# Patient Record
(demographics unavailable — no encounter records)

---

## 2024-10-11 NOTE — HISTORY OF PRESENT ILLNESS
[No Pertinent Cardiac History] : no history of aortic stenosis, atrial fibrillation, coronary artery disease, recent myocardial infarction, or implantable device/pacemaker [Asthma] : asthma [No Adverse Anesthesia Reaction] : no adverse anesthesia reaction in self or family member [(Patient denies any chest pain, claudication, dyspnea on exertion, orthopnea, palpitations or syncope)] : Patient denies any chest pain, claudication, dyspnea on exertion, orthopnea, palpitations or syncope [COPD] : no COPD [Sleep Apnea] : no sleep apnea [Smoker] : not a smoker [Chronic Anticoagulation] : no chronic anticoagulation [Chronic Kidney Disease] : no chronic kidney disease [Diabetes] : no diabetes [FreeTextEntry1] : Lumbar Spine Injections [FreeTextEntry2] : 10/17/24 [FreeTextEntry3] : Dr. Salinas [FreeTextEntry4] : 63 year old male with PMHx of HTN, Asthma, HLD, Degenerative Disk Disease presenting to the office for medical clearance for Lumbar Spinal Injections with Dr. Salinas on 10/17/24

## 2024-10-11 NOTE — END OF VISIT
[] : Resident [FreeTextEntry3] : Pt in optimal medical condition for procedure, to hold Eliquis 3 days before procedure as per cardiology recommendations. Please call with any questions.

## 2024-10-11 NOTE — ASSESSMENT
[Patient Optimized for Surgery] : Patient optimized for surgery [No Further Testing Recommended] : no further testing recommended [FreeTextEntry4] :  Patient in optimal medical condition for stated procedure Advised to avoid Aspirin and NSAIDs 1 week before surgery Medications reviewed Early ambulation and adequate DVT prophylaxis perioperatively as per protocol Will benefit from perioperative nebulizers and incentive spirometry Please call with any questions or concerns

## 2024-10-17 NOTE — PROCEDURE
[de-identified] : Lumbar Medial Branch Block Under Fluoroscopic Guidance   Attending: Sean Salinas DO  PREOPERATIVE DIAGNOSIS: Lumbar Facet Joint Pain POSTOPERATIVE DIAGNOSIS: same  SEDATION: As per Anesthesia   PROCEDURE PERFORMED:  Lumbar Medial Branch Block at the bilateral L4-5 and L5-S1 levels   ESTIMATED BLOOD LOSS:  None FLUOROSCOPY WAS USED.    PROCEDURE AND FINDINGS:   Patient was greeted in the pre-procedure holding area. The risk, benefits and alternatives to the procedure were again reviewed with the patient and written informed consent was placed in the chart. They were taken to the procedure room and positioned prone on the fluoroscopy table.  Prior to the procedure a time out was completed, verifying correct patient, procedure, and site.     An AP fluoroscopic  film was taken to identify the vertebral bodies, pedicles, transverse processes and superior articulating processes of interest. The skin was prepped with chlorhexidine and draped in the usual sterile fashion. The skin and subcutaneous tissue overlying the aforementioned anatomic targets were anesthetized using a 27-gauge 1-1/2 inch needle with 1% preservative-free lidocaine for a total volume of 6 mls.     Then a 25-gauge 3.5 inch Quincke spinal needle was advanced under fluoroscopic guidance to the junction of the superior articular process and transverse process of the levels of interest (and at the junction of the sacral ala and superior articular process for the L5 dorsal ramus). After negative aspiration, 0.5 mls of contrast was injected under AP view at each level. There was no evidence of intravascular uptake or intrathecal spread on imaging.    At this point, after negative aspiration, a solution of 0.5ml of Lidocaine 2% was injected at each level without incident. The needles were flushed with a small amount of contrast and removed. The needle insertion site was dressed appropriately.   Patient was taken to the recovery room where they were monitored for a brief period of time. They tolerated the procedure well and were discharged home in stable condition with post procedural instructions.

## 2024-11-01 NOTE — PHYSICAL EXAM
[FreeTextEntry1] : PE: Constitutional: In NAD, calm and cooperative MSK (Back)  Inspection: no gross swelling identified  Palpation: Tenderness of the bilateral lower lumbar paraspinals and over bilateral SI joints  ROM: Pain at end lumbar extension>>flexion  Strength: 5/5 strength in bilateral lower extremities  Reflexes: 2+ Patella reflex bilaterally, 2+ Achilles reflex bilaterally, negative clonus bilaterally  Sensation: Intact to light touch in bilateral lower extremities Special tests: SLR: negative bilaterally Facet loading: positive bilaterally. DAYANARA: positive bilaterally Yoemans: positive bilaterally SI joint compression test: positive bilaterally

## 2024-11-01 NOTE — HISTORY OF PRESENT ILLNESS
[FreeTextEntry1] : Ms. PEDRO FISH is a 63 year old female who presents for follow up. At last visit, she was ordered a bilateral L4-5 and L5-S1 MBB for which she had done on 10/17/24. She did not get significant relief from the MBB except for some minor improvement in ROM.  Denies any side effects from injection.   Location: Bilateral low back, equal Onset: Chronic for years, no inciting events Provocation/Palliative: Worse with activity, better with rest Quality: Achy, Sharp Radiation: None Severity: Moderate Timing: Not improving with time  No bowel/bladder changes. No groin numbness.

## 2024-11-01 NOTE — ASSESSMENT
[FreeTextEntry1] : Ms. PEDRO FISH is a 63 year old female who presents with persistent low back pain, likely from her underlying spondylosis although no significant relief from MBB. Pain may be more caused by an underlying sacroiliac joint related pain. Denies any radicular pain at this time. Denies any red flag signs. Will recommend: - MRI L Spine reviewed - Discussed with patient the risks (including but not limited to bleeding, elevated blood sugar, allergic reaction, infection, nerve damage, etc), benefits and alternatives to a sacroiliac joint steroid injection for which patient understands and would like to proceed. R/B/A of stopping Eliquis reviewed with patient but she would like to continue it.   Return for procedure. Patient aware of red flag signs including any changes to their bowel/bladder control, groin numbness or new weakness. Patient knows to seek immediate attention by calling 911 or going to nearest ER if these symptoms appear.  This patient is being managed for a complex chronic pain that requires ongoing medical management. The nature of this condition requires a longitudinal relationship and monitoring over time for appropriate treatment.

## 2024-11-26 NOTE — PHYSICAL EXAM
[Normal] : no joint swelling and grossly normal strength and tone [de-identified] : right hand no deformity - good ROM

## 2024-11-26 NOTE — HISTORY OF PRESENT ILLNESS
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 Following with Dr. Montgomery for this L shoulder pain, treated with trigger point injections.  Pain now stable Allergy / Asthma - now stable  Dr. Miguel Lal - neurologist- ([place]) B12 deficiency: Taking sublingual replacement as advised.  c/o intermittent right hand / thumb locking and pain  uses paraffin wax bath, heat therapy with some help no falls or trauma   HCM Mammogram / breast US 06/2024 - dense breasts - was advised MRI - declined  Colonoscopy- 7/2018- diverticulosis and polyps, repeated 07/2024 - to repeat 3-5 yrs as per pt - Dr Tyler Pap smear- s/p hysterectomy DEXA- 11/2023- osteopenia, remains on calcium and vit D supplements daily Shingles vaccine- had one dose, declines 2 nd dose LDCT 06/2024 - no change in nodule - to repeat in 1 yr

## 2024-12-05 NOTE — PHYSICAL EXAM
[de-identified] : Examination of the [left] wrist reveals discomfort with flexion and compression at the level of the volar carpal tunnel eliciting numbness/tingling throughout the fingertips. There is a positive tinel. Patient is able to delineate numbness to median-sided digits volarly. [There is no obvious thenar atrophy] Examination of the [left] cubital tunnel reveals discomfort with compression with associated numbness and tingling at the fingertips. There is a positive tinel. Examination at the level of the [left] wrist reveals tenderness at the level of the first dorsal compartment with a positive finkelstein.  Examination of the [left] thumb basal joint reveals pain with compression of the CMC joint with a positive grind test for pain. Adjacent thumb MCP joint is stable without hyperextension [de-identified] : [4] views of [bilateral hands and wrists] were reviewed today in my office and were seen by me and discussed with the patient.  These [show findings consistent with bilateral basal joint OA and findings of IP joint OA]

## 2024-12-05 NOTE — HISTORY OF PRESENT ILLNESS
[FreeTextEntry1] : Marlys is a 63-year-old female who presents today for follow-up for chronic exacerbated left wrist and hand discomfort as well as numbness and tingling.  The symptoms are bothersome during both daytime and nighttime and are affecting her sleep.  Symptoms are bothersome and are affecting her activities involve pinch and  as well. EMG study from April 2024 reviewed today separately.

## 2024-12-06 NOTE — HISTORY OF PRESENT ILLNESS
[FreeTextEntry1] : Ms. PEDRO FISH is a 63 year old female who presents for follow up. At last visit, she was ordered a bilateral SI joint injection for which she had done on 11/21/24. She says she has had at least 50% relief with the injection. She is still taking Tylenol PRN. Denies any side effects or adverse effects from procedure.   Location: Bilateral low back, equal Onset: Chronic for years, no inciting events Provocation/Palliative: Worse with activity, better with rest Quality: Achy, Sharp Radiation: None Severity: Mild Timing: Improved with CSI.   No bowel/bladder changes. No groin numbness.

## 2024-12-06 NOTE — PHYSICAL EXAM
[FreeTextEntry1] : PE: Constitutional: In NAD, calm and cooperative MSK (Back)  Inspection: no gross swelling identified, no erythema or swelling around injection sites  Palpation: No TTP over lower back  ROM: Mild pain at end lumbar extension>>flexion  Strength: 5/5 strength in bilateral lower extremities  Reflexes: 2+ Patella reflex bilaterally, 2+ Achilles reflex bilaterally, negative clonus bilaterally  Sensation: Intact to light touch in bilateral lower extremities Special tests: SLR: negative bilaterally Facet loading: positive bilaterally.

## 2024-12-06 NOTE — ASSESSMENT
[FreeTextEntry1] : Ms. PEDRO FISH is a 63 year old female who presents with persistent low back pain, likely from SI joint related pain given significant relief from CSIs rather than MBBs. Denies any radicular pain at this time. Denies any red flag signs. Will recommend: - MRI L Spine reviewed - She will try to decrease Tizanidine down to BID (takes 1mg at a time), advised her of R/B/A of medication for which she understands.  - She will restart HEP  Return 6-8 weeks. Patient aware of red flag signs including any changes to their bowel/bladder control, groin numbness or new weakness. Patient knows to seek immediate attention by calling 911 or going to nearest ER if these symptoms appear.  This patient is being managed for a complex chronic pain that requires ongoing medical management. The nature of this condition requires a longitudinal relationship and monitoring over time for appropriate treatment.

## 2025-01-14 NOTE — HISTORY OF PRESENT ILLNESS
[FreeTextEntry1] : Marlys is a pleasant 63-year-old female who presents today with a longstanding history of left-sided wrist and hand discomfort.  She complains of numbness and tingling radiating down to all the fingers.  Describes significant improvement with prior injections and bracing however with recurrence

## 2025-01-14 NOTE — ASSESSMENT
[FreeTextEntry1] : ASSESSMENT: The patient comes in today with chronic exacerbated symptoms of peripheral nerve impingement including carpal and cubital.  We have also discussed basal joint arthropathy and tendinosis.  For these conditions the injections have been very helpful.  Describes improvement with nerve injections as well.  Today she does elect to repeat them.  We have discussed continued bracing and activity modification. We have also discussed separately right wrist ulnar-sided wrist discomfort.  She does have a brace.  We have discussed treatment options including oral medication as well   The patient was adequately and thoroughly informed of my assessment of their current condition(s).  - This may diminish bodily function for the extremity. We discussed prognosis, treatment modalities including operative and nonoperative options for the above diagnostic assessment. For this, when accessible, I was able to review other physicians note(s) including reviewing other tests, imaging results as well as personally view these results for my own interpretation.   Injection:  The risks and benefits of a steroid injection were discussed in detail. The risks include but are not limited to: pain, infection, swelling, flare response, bleeding, subcutaneous fat atrophy, skin depigmentation and/or elevation of blood sugar. The risk of incomplete resolution of symptoms, recurrence and additional intervention was reviewed and considered by the patient.  The patient agreed to proceed and under a sterile prep, I injected 1 unit into 1 cc of a combination of Celestone and Lidocaine into the left carpal tunnel, left cubital tunnel. The patient tolerated the injection well. The patient was adequately and thoroughly informed of my assessment of their current condition(s).  DISCUSSION: 1.  Injections as above.  Activity modification.  Follow-up in 2months.   2.  Left basal joint bracing, right ulnar-sided wrist pain bracing 3. [x]

## 2025-01-14 NOTE — PHYSICAL EXAM
[de-identified] : Examination of the [left] cubital tunnel reveals discomfort with compression with associated numbness and tingling at the fingertips. There is a positive tinel. Examination of the [left] wrist reveals discomfort with compression at the level of the volar carpal tunnel eliciting numbness/tingling throughout the fingertips Examination at the level of the [left] wrist reveals tenderness at the level of the first dorsal compartment with a positive finkelstein. Examination of the left thumb basal joint reveals pain with compression of the CMC joint with a positive grind test for pain.  [Adjacent thumb MCP joint is stable without hyperextension]  Examination of the [right] wrist reveals tenderness at the distal ulna dorsal with pain upon compression of the fovea. There is discomfort with ulnar grinding as well as with ends of pronation and supination at the ulnar wrist. There is discomfort with compression of the dorsal triquetrum.    [de-identified] : [4] views of [bilateral hands and wrists] were reviewed today in my office and were seen by me and discussed with the patient.  These [show findings consistent with bilateral basal joint OA and findings of IP joint OA]

## 2025-03-04 NOTE — ASSESSMENT
[FreeTextEntry1] :  EKG: Normal sinus rhythm at 68 right ventricular conduction delay leftward axis deviation nonspecific T wave abnormalities.  ECG obtained to assist in diagnosis and management of assessed problem(s).  Laboratory data----------6/2/20------9/12/20 ---6/30/21----2/25/22--5/31/23--6/5/24 Cholesterol-----------------232------------177----------------------163------160------154 HDL---------------------------46-------------69------------------------68---------73-------64 LDL -----------not measurable -----------88------------------------81---------67-------74 Triglycerides-- ------------670-------------98-------------------------71-------100-------85 Hemoglobin ----------------12 -----------------------11.8------------5.3------------------5.3 A1c  5.3 Creat---------------------------------------------------0.81------------0.70  Echocardiogram 3/1/2024 Normal LV size and function with small area of inferior basilar hypokinesis ejection fraction of 55 to 60% Mitral valve thickening Aortic valve sclerosis Mild dilatation of the ascending aorta. Compared to prior study LVH has resolved Ascending aorta appears slightly smaller in diameter.  Echocardiogram 5/8/2023: Basal inferior wall hypokinesis but ejection fraction preserved. LVH Biatrial enlargement Moderate dilatation of the ascending aorta (4.6/4.4) slightly increased comparison to study 2021  Echocardiogram 8/10/2021: Basal mid inferolateral segments hypokinetic LVEF 55% Biatrial enlargement Dilated aorta 4.1 cm  Carotid duplex 8/10/2021: Left: Minimal plaque: Peak 118 cm/s Right carotid: No significant disease  Pharmacologic nuclear stress test 7/23/20: Defect of the mid apical and inferolateral consistent with scarring and infarction. At no ischemia is seen. Unchanged in comparison to prior study 2018  Lexiscan sestamibi stress test March 6, 2018 Moderate size fixed defect involving the basal and mid inferolateral wall consistent with infarct. No significant ischemia. Gated ejection fraction mildly diminished  A cardiac CT for calcium scoring performed at an outside facility her calcium score is virtually zero and no plaque is identified.  Impression: 1. PAF: No clinical recurrence of atrial fibrillation since her last office visit. Review of her [facility] recordings show sinus rhythm. Denies any recent palpitations.   2. By ECG criteria, echocardiography, and nuclear stress testing criteria, the patient had a moderate size inferolateral wall mi. The wall motion abnormality previously seen seems to be somewhat improved. The absence of atherosclerotic disease on coronary angiography suggests that this was maybe a thrombotic/embolic phenomena.  3. Status post SVT ablation; off amiodarone therapy. Rhythm today SR. No clinical recurrence of SVT. No palpitations.  4. Dizzy spells and lack of balance resolved since staggering her medications. Headache has resolved since increase in Irbesartan. Home blood pressures well controlled.   5. Mildly dilated ascending aorta appears to be stable.  6. Inferolateral wall MI as has been previously documented with preserved LV function unchanged  7.  There is been resolution of LVH by echo criteria consistent with very close blood pressure control.  Plan: 1. Continue current antihypertensives. Continue periodic home blood pressure monitoring. Notify us if she has persistent elevations. Avoid salt. Patient advised that chronic pain may be a factor in blood pressure elevations.   2. Continue to follow a heart healthy diet consisting of more vegetables, leans meats, whole grains, nuts and fruits. Avoid trans fats, saturated fats and processed meats.  3. Continue Eliquis for thromboembolic ppx.   4. Repeat echocardiogram in 1 to 2 years looking at wall motion abnormalities dilated aorta and the interval findings of LVH with resolution.  5. Pt knows to call if any new or worsening symptoms. In the absence of any cardiac associated symptoms clinical follow up can be made 6 months.   EKG obtained to assist in diagnosis and management of assessed problem(s).

## 2025-03-04 NOTE — HISTORY OF PRESENT ILLNESS
[FreeTextEntry1] : Under a lot of stress because her  has prostate cancer and will require full prostatectomy soon.  Continues to take Irbesartan 150 mg BID and reports that home blood pressures are well controlled.  Tolerating medications well and without side effects.   Pt denies any chest pain, dizziness, syncope, near-syncope, SOB, edema, orthopnea or PND.

## 2025-03-04 NOTE — REVIEW OF SYSTEMS
[Weight Gain (___ Lbs)] : [unfilled] ~Ulb weight gain [Joint Pain] : joint pain [Negative] : Neurological [Weight Loss (___ Lbs)] : no recent weight loss [Palpitations] : no palpitations [FreeTextEntry2] : Other than as documented here and in the HPI, the thirteen point ROS is negative

## 2025-03-04 NOTE — REASON FOR VISIT
[FreeTextEntry1] : PEDRO FISH is a 63-year-old F presents here for cardiology follow-up  Her hx includes: 1. inferolateral wall myocardial infarction  2. Ablation for supraventricular tachycardia Fall 2017 3. Postprocedural pericarditis, pericardial effusion, pericardial window 4. History of vasodepressor physiology 5. History of chronic pancreatic insufficiency 6. HTN 7. Paroxysmal afib 8. Palpitations

## 2025-03-04 NOTE — PHYSICAL EXAM
[FreeTextEntry1] :                    Well appearing and nourished and in no distress.  Eyes:  No conjunctival injection and no xanthelasmas. HEENT:  Normocephalic.Normal oral mucosa. No pallor or cyanosis Neck:  No jugular venous distension. with normal A and V wave forms. No palpable adenopathy. Cardiovascular:  Normal rate and rhythm with normal S1, S2 and a grade 1/6 systolic murmur. Distal arterial pulses are normal. No significant peripheral edema. Pulmonary:  Lungs are clear to auscultation and percussion. Normal respiratory pattern without any accessory muscle use Abdomen:  Soft, non-tender ; no palpable organomegaly or masses. Extremities:  No digital clubbing, cyanosis or ischemic changes. Skin:  No skin lesions, rashes, ulcers or xanthomas. Psychiatric:  Alert and oriented to person, place and time. Appropriate mood and affect.

## 2025-03-12 NOTE — PHYSICAL EXAM
[Normal] : no rash [Soft] : abdomen soft [Non-distended] : non-distended [de-identified] : Epigastric tenderness on palpation noted, no rigidity or guarding.

## 2025-03-12 NOTE — PLAN
[FreeTextEntry1] : A/P: New onset abdominal pain: Needs further workup, to check labs, CT abdomen stat To ER if has any worsening pain or any other new symptoms.  Patient in agreement.  Asthma: Stable on inhaler and montelukast, to continue with same. No recent exacerbation, doing well  Hypertension: Stable on medications, to continue with same, strict low-salt diet, to check blood pressure at home and keep a log.  Lung nodules: Last CT done June 2024 reviewed, to be repeated in 1 year will be monitored.  Cardiomyopathy: Asymptomatic, to continue with medications, and follow-up with cardiology as advised Afib: rate controlled, to c/w metoprolol and eliquis, f/u with cardiology as advised.   Hyperlipidemia: Advised on strict dietary modification, daily regular exercise. Follow-up after CT

## 2025-03-12 NOTE — HISTORY OF PRESENT ILLNESS
[FreeTextEntry1] : f/u HTN/ chronic pancreatic insufficiency/abdominal pain [de-identified] : Marlys is a 64 yo F w PMHx pAF on eliquis, hx inferolateral infarction likely 2/2 thromboembolic event, ST s/p ablation (2017), HTN, hx chronic pancreatic insufficiency, endometriosis s/o complete hysterectomy, osteopenia, enlarged ascending aorta (4.1->4.6) - following with cardiology. Following with Dr. Montgomery for this L shoulder pain, treated with trigger point injections.  Pain now stable Allergy / Asthma - now stable  Dr. Miguel Lal - neurologist- ([place]) B12 deficiency: Taking sublingual replacement as advised.  Endorses approximately 10-day onset of epigastric dull pain radiates to the back, has associated nausea, no vomiting, no diarrhea, melena, blood in stools.  Symptoms feel similar to when she had pancreatitis in the past.  Denies any fever or chills.   HCM Mammogram / breast US 06/2024 - dense breasts - was advised MRI - declined  Colonoscopy- 7/2018- diverticulosis and polyps, repeated 07/2024 - to repeat 3-5 yrs as per pt - Dr Tyler Pap smear- s/p hysterectomy DEXA- 11/2023- osteopenia, remains on calcium and vit D supplements daily Shingles vaccine- had one dose, declines 2 nd dose LDCT 06/2024 - no change in nodule - to repeat in 1 yr

## 2025-04-22 NOTE — HISTORY OF PRESENT ILLNESS
[FreeTextEntry1] : Marlys is a pleasant 63-year-old female who presents today with a longstanding history of left-sided wrist and hand discomfort.  She complains of numbness and tingling radiating down to all the fingers.  Patient describes difficulty with  and pinch.  Wakes her up at night normal...

## 2025-04-22 NOTE — PHYSICAL EXAM
[de-identified] : Examination of the [left] cubital tunnel reveals discomfort with compression with associated numbness and tingling at the fingertips. There is a positive tinel. Exam [left] wrist + Durkan's with + N/T. There is + tinel. Patient is able to delineate numbness to median-sided digits volarly. [No obvious thenar atrophy]  Examination at the level of the [left] wrist reveals tenderness at the level of the first dorsal compartment with a positive finkelstein. Examination of the left thumb basal joint reveals pain with compression of the CMC joint with a positive grind test for pain.  [Adjacent thumb MCP joint is stable without hyperextension] [de-identified] : [4] views of [bilateral hands and wrists] were reviewed today in my office and were seen by me and discussed with the patient.  These [show findings consistent with bilateral basal joint OA and findings of IP joint OA]

## 2025-04-22 NOTE — ASSESSMENT
[FreeTextEntry1] : ASSESSMENT: The patient comes in today with chronic exacerbated complaints of elbow wrist and thumb discomfort.  At this stage we have discussed carpal tunnel and cubital tunnel.  We have discussed bracing activity modification elbow extension modalities.  We have discussed basal joint arthropathy and tendinosis.  At this stage we have discussed all of the above very thoroughly and patient elects to proceed with injections.  We have discussed risk and benefits   The patient was adequately and thoroughly informed of my assessment of their current condition(s).  - This may diminish bodily function for the extremity. We discussed prognosis, tx modalities including operative and nonoperative options for the above diagnostic assessment. As always, 2nd opinion is always provided as an option.  When accessible, I was able to review other physicians note(s) including reviewing other tests, imaging results as well as personally view these results for my own interpretation.   Injection procedure for left carpal tunnel:   The risks and benefits of a steroid injection were discussed in detail. The risks include but are not limited to: pain, infection, swelling, flare response, bleeding, subcutaneous fat atrophy, skin depigmentation and/or elevation of blood sugar. The risk of incomplete resolution of symptoms, recurrence and additional intervention was reviewed and considered by the patient. The patient agreed to proceed and under a sterile prep, I injected 1 unit 6mg into 1 cc of a combination of Celestone and Lidocaine into the above stated location for the procedure. The patient tolerated the injection well.  Injection procedure for left cubital tunnel:   The risks and benefits of a steroid injection were discussed in detail. The risks include but are not limited to: pain, infection, swelling, flare response, bleeding, subcutaneous fat atrophy, skin depigmentation and/or elevation of blood sugar. The risk of incomplete resolution of symptoms, recurrence and additional intervention was reviewed and considered by the patient. The patient agreed to proceed and under a sterile prep, I injected 1 unit 6mg into 1 cc of a combination of Celestone and Lidocaine into the above stated location for the procedure. The patient tolerated the injection well.  Injection procedure for left first dorsal compartment tendon subsheath:   The risks and benefits of a steroid injection were discussed in detail. The risks include but are not limited to: pain, infection, swelling, flare response, bleeding, subcutaneous fat atrophy, skin depigmentation and/or elevation of blood sugar. The risk of incomplete resolution of symptoms, recurrence and additional intervention was reviewed and considered by the patient. The patient agreed to proceed and under a sterile prep, I injected 1 unit 6mg into 1 cc of a combination of Celestone and Lidocaine into the above stated location for the procedure. The patient tolerated the injection well.  Injection procedure for left first CMC joint-medium joint:   The risks and benefits of a steroid injection were discussed in detail. The risks include but are not limited to: pain, infection, swelling, flare response, bleeding, subcutaneous fat atrophy, skin depigmentation and/or elevation of blood sugar. The risk of incomplete resolution of symptoms, recurrence and additional intervention was reviewed and considered by the patient. The patient agreed to proceed and under a sterile prep, I injected 1 unit 6mg into 1 cc of a combination of Celestone and Lidocaine into the above stated location for the procedure. The patient tolerated the injection well.  The patient was adequately and thoroughly informed of my assessment of their current condition(s).  DISCUSSION: 1.  Injections as above.  Bracing activity modification.  Follow-up 3 months as requested 2.  Appreciates nonoperative care 3. [x]

## 2025-05-13 NOTE — ASSESSMENT
[FreeTextEntry1] : Ms. PEDRO FISH is a 63 year old female who presents with persistent low back pain, likely from SI joint related pain given significant relief from CSIs rather than MBBs. She now presents with return of pain. Denies any radicular pain at this time. She does however now complain of severe L hip pain, possibly due to OA or other pathology. Denies any red flag signs. Will recommend: - MRI L Spine reviewed - Will order MRI L hip given severity of L hip pain and degenerative changes seen on CT Abd/Pelvis done in 3/2025. Will call patient with results.  - Discussed with patient the risks (including but not limited to bleeding, elevated blood sugar, allergic reaction, infection, nerve damage, etc), benefits and alternatives to a bilateral sacroiliac joint steroid injection for which patient understands and would like to proceed.   Return for procedure. Patient aware of red flag signs including any changes to their bowel/bladder control, groin numbness or new weakness. Patient knows to seek immediate attention by calling 911 or going to nearest ER if these symptoms appear.  This patient is being managed for a complex chronic pain that requires ongoing medical management. The nature of this condition requires a longitudinal relationship and monitoring over time for appropriate treatment.

## 2025-05-13 NOTE — PHYSICAL EXAM
[FreeTextEntry1] : PE: Constitutional: In NAD, calm and cooperative MSK (Back/L hip)  Inspection: no gross swelling identified  Palpation: Mild TTP over SI joints  ROM: Mild pain at end lumbar extension>>flexion, significant pain with IR of L hip  Strength: 5/5 strength in bilateral lower extremities  Reflexes: 2+ Patella reflex bilaterally, 2+ Achilles reflex bilaterally, negative clonus bilaterally  Sensation: Intact to light touch in bilateral lower extremities Special tests: SLR: negative bilaterally DAYANARA: positive bilaterally FADIR: positive on L, negative on R SI joint compression test positive bilaterally Yoemans positive bilaterally

## 2025-05-13 NOTE — DATA REVIEWED
[FreeTextEntry1] : CMP 6/2024 reviewed CBC 6/2024 reviewed MR L Spine 2/2024 reviewed CT Abd/Pelvis 3/2025 reviewed and interpreted by me: degenerative changes of hips seen  EXAM: [id number] - CT ABDOMEN AND PELVIS WAW IC  - ORDERED BY: TIMMY AMOS   PROCEDURE DATE:  03/13/2025    INTERPRETATION:  CLINICAL INFORMATION: Abdominal pain; pancreatic insufficiency.  ADDITIONAL CLINICAL INFORMATION: Other, Non-specified  COMPARISON: None.  CONTRAST/COMPLICATIONS: IV Contrast: Omnipaque 350  90 cc administered   10 cc discarded Oral Contrast: NONE   PROCEDURE: CT of the Abdomen and Pelvis was performed. Arterial and Portal Venous phases were acquired. Sagittal and coronal reformats were performed.  FINDINGS: LOWER CHEST: Calcified left lower lobe nodules consistent with prior granulomatous disease. Partially visualized breast implants.  LIVER: 1.1 cm indeterminate segment 8 lesion (9-48). BILE DUCTS: Normal caliber. GALLBLADDER: Within normal limits. SPLEEN: Splenic calcifications. PANCREAS: Within normal limits. ADRENALS: Within normal limits. KIDNEYS/URETERS: Symmetric renal enhancement. Left parapelvic cysts. Small left cortical renal cyst.  BLADDER: Within normal limits. REPRODUCTIVE ORGANS: Hysterectomy.  BOWEL: No bowel obstruction. Colonic diverticulosis. Normal appendix. PERITONEUM/RETROPERITONEUM: Within normal limits. VESSELS: Atherosclerotic changes. LYMPH NODES: No lymphadenopathy. ABDOMINAL WALL: Within normal limits. BONES: Scoliosis and degenerative changes.  IMPRESSION:  Normal CT appearance of the pancreas.  Indeterminate 1.1 cm hepatic lesion. Recommend MRI abdomen with and without contrast for further evaluation.    --- End of Report ---       WENDY MCGUIRE MD; Attending Radiologist This document has been electronically signed. Mar 13 2025  3:12PM

## 2025-05-13 NOTE — HISTORY OF PRESENT ILLNESS
[FreeTextEntry1] : Ms. PEDRO FISH is a 63 year old female who presents for follow up. At last visit in 12/2024, she was restarted on HEP and advised to decrease Tizanidine usage. She now returns with similar pain but also complaining of L lateral hip pain for last few months. No new trauma. No radicular pain.    Location: Bilateral low back, equal, L hip Onset: Chronic for years, no inciting events in terms of back pain. Now also complaining of L lateral hip pain since early 2025.  Provocation/Palliative: Worse with activity, better with rest Quality: Achy, Sharp Radiation: None Severity: 9/10 in L hip, 7/10 in low back Timing: Improved with CSI in past, not worsening.   No bowel/bladder changes. No groin numbness.

## 2025-06-25 NOTE — DATA REVIEWED
[FreeTextEntry1] : CMP 6/2024 reviewed CBC 6/2024 reviewed MR L Spine 2/2024 reviewed CT Abd/Pelvis 3/2025 reviewed and interpreted by me: degenerative changes of hips seen  EXAM: [id number] - CT ABDOMEN AND PELVIS WAW IC - ORDERED BY: TIMMY AMOS   PROCEDURE DATE: 03/13/2025    INTERPRETATION: CLINICAL INFORMATION: Abdominal pain; pancreatic insufficiency.  ADDITIONAL CLINICAL INFORMATION: Other, Non-specified  COMPARISON: None.  CONTRAST/COMPLICATIONS: IV Contrast: Omnipaque 350 90 cc administered 10 cc discarded Oral Contrast: NONE   PROCEDURE: CT of the Abdomen and Pelvis was performed. Arterial and Portal Venous phases were acquired. Sagittal and coronal reformats were performed.  FINDINGS: LOWER CHEST: Calcified left lower lobe nodules consistent with prior granulomatous disease. Partially visualized breast implants.  LIVER: 1.1 cm indeterminate segment 8 lesion (9-48). BILE DUCTS: Normal caliber. GALLBLADDER: Within normal limits. SPLEEN: Splenic calcifications. PANCREAS: Within normal limits. ADRENALS: Within normal limits. KIDNEYS/URETERS: Symmetric renal enhancement. Left parapelvic cysts. Small left cortical renal cyst.  BLADDER: Within normal limits. REPRODUCTIVE ORGANS: Hysterectomy.  BOWEL: No bowel obstruction. Colonic diverticulosis. Normal appendix. PERITONEUM/RETROPERITONEUM: Within normal limits. VESSELS: Atherosclerotic changes. LYMPH NODES: No lymphadenopathy. ABDOMINAL WALL: Within normal limits. BONES: Scoliosis and degenerative changes.  IMPRESSION:  Normal CT appearance of the pancreas.  Indeterminate 1.1 cm hepatic lesion. Recommend MRI abdomen with and without contrast for further evaluation.    --- End of Report ---       WENDY MCGUIRE MD; Attending Radiologist This document has been electronically signed. Mar 13 2025 3:12PM

## 2025-06-25 NOTE — ASSESSMENT
[FreeTextEntry1] : Ms. PEDRO FISH is a 63 year old female who presents with persistent low back pain, likely from SI joint related pain given significant relief from CSIs rather than MBBs. She now presents with return of pain. Denies any radicular pain at this time. Denies any red flag signs. Will recommend: - MRI L Spine reviewed - Discussed with patient the risks (including but not limited to bleeding, elevated blood sugar, allergic reaction, infection, nerve damage, etc), benefits and alternatives to a repeat bilateral sacroiliac joint steroid injection for which patient understands and would like to proceed.  Return for procedure. Patient aware of red flag signs including any changes to their bowel/bladder control, groin numbness or new weakness. Patient knows to seek immediate attention by calling 911 or going to nearest ER if these symptoms appear.  This patient is being managed for a complex chronic pain that requires ongoing medical management. The nature of this condition requires a longitudinal relationship and monitoring over time for appropriate treatment.

## 2025-06-25 NOTE — PHYSICAL EXAM
[FreeTextEntry1] : PE: Constitutional: In NAD, calm and cooperative MSK (Back/L hip)  Inspection: no gross swelling identified  Palpation: Mild TTP over SI joints  ROM: Mild pain at end lumbar extension>>flexion, significant pain with IR of L hip  Strength: 5/5 strength in bilateral lower extremities  Reflexes: 2+ Patella reflex bilaterally, 2+ Achilles reflex bilaterally, negative clonus bilaterally  Sensation: Intact to light touch in bilateral lower extremities Special tests: SLR: negative bilaterally DAYANARA: positive bilaterally FADIR: positive on L, negative on R SI joint compression test positive bilaterally Yoemans positive bilaterally.

## 2025-06-25 NOTE — DATA REVIEWED
[FreeTextEntry1] : CMP 6/2024 reviewed CBC 6/2024 reviewed MR L Spine 2/2024 reviewed CT Abd/Pelvis 3/2025 reviewed and interpreted by me: degenerative changes of hips seen  EXAM: [id number] - CT ABDOMEN AND PELVIS WAW IC - ORDERED BY: TIMMY AMOS   PROCEDURE DATE: 03/13/2025    INTERPRETATION: CLINICAL INFORMATION: Abdominal pain; pancreatic insufficiency.  ADDITIONAL CLINICAL INFORMATION: Other, Non-specified  COMPARISON: None.  CONTRAST/COMPLICATIONS: IV Contrast: Omnipaque 350 90 cc administered 10 cc discarded Oral Contrast: NONE   PROCEDURE: CT of the Abdomen and Pelvis was performed. Arterial and Portal Venous phases were acquired. Sagittal and coronal reformats were performed.  FINDINGS: LOWER CHEST: Calcified left lower lobe nodules consistent with prior granulomatous disease. Partially visualized breast implants.  LIVER: 1.1 cm indeterminate segment 8 lesion (9-48). BILE DUCTS: Normal caliber. GALLBLADDER: Within normal limits. SPLEEN: Splenic calcifications. PANCREAS: Within normal limits. ADRENALS: Within normal limits. KIDNEYS/URETERS: Symmetric renal enhancement. Left parapelvic cysts. Small left cortical renal cyst.  BLADDER: Within normal limits. REPRODUCTIVE ORGANS: Hysterectomy.  BOWEL: No bowel obstruction. Colonic diverticulosis. Normal appendix. PERITONEUM/RETROPERITONEUM: Within normal limits. VESSELS: Atherosclerotic changes. LYMPH NODES: No lymphadenopathy. ABDOMINAL WALL: Within normal limits. BONES: Scoliosis and degenerative changes.  IMPRESSION:  Normal CT appearance of the pancreas.  Indeterminate 1.1 cm hepatic lesion. Recommend MRI abdomen with and without contrast for further evaluation.    --- End of Report ---       WENDY MCGUIRE MD; Attending Radiologist This document has been electronically signed. Mar 13 2025 3:12PM

## 2025-06-25 NOTE — HISTORY OF PRESENT ILLNESS
[FreeTextEntry1] : Ms. PEDRO FISH is a 63 year old female who presents for follow up. At last visit, she was ordered an MRI L Hip and ordered a bilateral SI joint CSI for which she underwent it on 6/5/25. She got significant relief from the CSIs (over 75%) but then did some gardening that has recently provoked the pain. Denies any new symptoms. She said the injections also helped with her hip pain.    Location: Bilateral low back, equal, L hip Onset: Chronic for years, no inciting events in terms of back pain. Now also complaining of L lateral hip pain since early 2025. Provocation/Palliative: Worse with activity, better with rest Quality: Achy, Sharp Radiation: None Severity: 2/10 at rest, can be moderate Timing: Improved with CSI in past  No bowel/bladder changes. No groin numbness.

## 2025-07-07 NOTE — HISTORY OF PRESENT ILLNESS
[FreeTextEntry1] : Patient woke up 3:30 am with heart palpitations and dizziness  /78 (usual BP 130s/80-90s) and noted she was in Afib with HR of 120s on Kartia device. She went to the emergency department at [facility] but felt that it was taking an inordinate of time to be seen so she eloped without treatment. Hospital records reveal EKG afib rvr hr 107, CXR clear lungs, BNP 1223, CBC/CMP/trop unremarkable  She reports strict compliance with Eliquis  she feels "exhausted" with intermittent palpitations. Dizziness has resolved.  Pt denies any chest pain, syncope, near-syncope, SOB, edema, orthopnea or PND.

## 2025-07-07 NOTE — ASSESSMENT
[FreeTextEntry1] :  EKG: a fib ventricular rate 85 irbbb also present on prior, nonspecific T wave abnormalities - unchanged  ECG obtained to assist in diagnosis and management of assessed problem(s).  Laboratory data----------6/2/20------9/12/20 ---6/30/21----2/25/22--5/31/23--6/5/24 Cholesterol-----------------232------------177----------------------163------160------154 HDL---------------------------46-------------69------------------------68---------73-------64 LDL -----------not measurable -----------88------------------------81---------67-------74 Triglycerides-- ------------670-------------98-------------------------71-------100-------85 Hemoglobin ----------------12 -----------------------11.8------------5.3------------------5.3 A1c  5.3 Creat---------------------------------------------------0.81------------0.70  Echocardiogram 3/1/2024 Normal LV size and function with small area of inferior basilar hypokinesis ejection fraction of 55 to 60% Mitral valve thickening Aortic valve sclerosis Mild dilatation of the ascending aorta. Compared to prior study LVH has resolved Ascending aorta appears slightly smaller in diameter.  Echocardiogram 5/8/2023: Basal inferior wall hypokinesis but ejection fraction preserved. LVH Biatrial enlargement Moderate dilatation of the ascending aorta (4.6/4.4) slightly increased comparison to study 2021  Echocardiogram 8/10/2021: Basal mid inferolateral segments hypokinetic LVEF 55% Biatrial enlargement Dilated aorta 4.1 cm  Carotid duplex 8/10/2021: Left: Minimal plaque: Peak 118 cm/s Right carotid: No significant disease  Pharmacologic nuclear stress test 7/23/20: Defect of the mid apical and inferolateral consistent with scarring and infarction. At no ischemia is seen. Unchanged in comparison to prior study 2018  Lexiscan sestamibi stress test March 6, 2018 Moderate size fixed defect involving the basal and mid inferolateral wall consistent with infarct. No significant ischemia. Gated ejection fraction mildly diminished  A cardiac CT for calcium scoring performed at an outside facility her calcium score is virtually zero and no plaque is identified.  Impression: 1. PAF with recurrence of symptomatic atrial fibrillation   2. By ECG criteria, echocardiography, and nuclear stress testing criteria, the patient had a moderate size inferolateral wall mi. The wall motion abnormality previously seen seems to be somewhat improved. The absence of atherosclerotic disease on coronary angiography suggests that this was maybe a thrombotic/embolic phenomena.  3. Status post SVT ablation; off amiodarone therapy. Rhythm today afib. No clinical recurrence of SVT.   4.  Home blood pressures well controlled.   5. Mildly dilated ascending aorta appears to be stable.  6. Inferolateral wall MI as has been previously documented with preserved LV function unchanged  7.  There is been resolution of LVH by echo criteria consistent with very close blood pressure control.  Plan: 1. Start Sotalol 80 bid (hold Toprol 50 while patient on Toprol), patient needs to follow up in the office this week for ECG to ensure no QTc elongation. She will call Dr Beltrán office to schedule possible DCCV.Continue Eliquis for thromboembolic ppx.    2. Continue to follow a heart healthy diet consisting of more vegetables, leans meats, whole grains, nuts and fruits. Avoid trans fats, saturated fats and processed meats.  3. Continue Irbesartan 300 and norvasc 5 mg, continue to monitor BP at home   4. Repeat echocardiogram in 1 to 2 years looking at wall motion abnormalities dilated aorta and the interval findings of LVH with resolution.  5. Pt knows to call if any new or worsening symptoms.   Has office appt on Friday this week to check ecg, check rate and Rythm

## 2025-07-07 NOTE — REVIEW OF SYSTEMS
[Joint Pain] : joint pain [Negative] : Neurological [Weight Gain (___ Lbs)] : [unfilled] ~Ulb weight gain [Weight Loss (___ Lbs)] : no recent weight loss [SOB] : no shortness of breath [Dyspnea on exertion] : not dyspnea during exertion [Chest Discomfort] : no chest discomfort [Lower Ext Edema] : no extremity edema [Leg Claudication] : no intermittent leg claudication [Palpitations] : palpitations [Orthopnea] : no orthopnea [PND] : no PND [Syncope] : no syncope [FreeTextEntry2] : Other than as documented here and in the HPI, the thirteen point ROS is negative

## 2025-07-07 NOTE — REASON FOR VISIT
[FreeTextEntry1] : PEDRO FISH is a 63-year-old F presents here for cardiology follow-up after ER visit for symptomatic atrial fibrillation with rapid ventricular response   Her hx includes: 1. inferolateral wall myocardial infarction  2. Ablation for supraventricular tachycardia Fall 2017 3. Postprocedural pericarditis, pericardial effusion, pericardial window 4. History of vasodepressor physiology 5. History of chronic pancreatic insufficiency 6. HTN 7. Paroxysmal afib 8. Palpitations

## 2025-07-07 NOTE — HISTORY OF PRESENT ILLNESS
[FreeTextEntry1] : Patient woke up 3:30 am with heart palpitations and dizziness  /78 (usual BP 130s/80-90s) and noted she was in Afib with HR of 120s on Kartia device. She went to the emergency department at [facility] but felt that it was taking an inordinate of time to be seen so she eloped without treatment. Hospital records reveal EKG afib rvr hr 107, CXR clear lungs, BNP 1223, CBC/CMP/trop unremarkable  She reports strict compliance with Eliquis  she feels "exhausted" with intermittent palpitations. Dizziness has resolved.  Pt denies any chest pain, syncope, near-syncope, SOB, edema, orthopnea or PND.

## 2025-07-23 NOTE — HISTORY OF PRESENT ILLNESS
[FreeTextEntry1] : Marlys is a 63-year-old female who presents today for follow-up for chronic exacerbated left wrist and hand discomfort as well as numbness and tingling.  The symptoms are bothersome during both daytime and nighttime and are affecting her sleep.  Symptoms are bothersome and are affecting her activities involve pinch and  as well. EMG study from April 2024 reviewed today separately.  She describes relief with prior injections, however some symptoms have returned.

## 2025-07-23 NOTE — ASSESSMENT
[FreeTextEntry1] : ASSESSMENT: The patient comes in today for follow-up for chronic exacerbated left wrist and hand discomfort as well as numbness and tingling.  The symptoms are bothersome during both daytime and nighttime and are affecting her sleep.  Symptoms are bothersome and are affecting her activities that involve pinch and  as well. She does describe some relief with her injections last visit, however symptoms have returned.  Symptoms today are consistent with left carpal tunnel syndrome, left cubital tunnel syndrome, left thumb CMC joint arthropathy, and left wrist de Quervain's tenosynovitis.  Treatment modalities were discussed, the patient elects for repeat injections as prior injections have been helpful.  We have also discussed activity modifications and bracing for her condition.  Nerve study from April 2024 reviewed today separately.  We have discussed carpal tunnel and cubital tunnel release surgery.   The patient was adequately and thoroughly informed of my assessment of their current condition(s).  - This may diminish bodily function for the extremity.  We discussed prognosis, treatment modalities including operative and nonoperative options for the above diagnostic assessment. As always, 2nd opinion is always provided as an option. For this, when accessible, I was able to review other physicians note(s) including reviewing other tests, imaging results as well as personally view these results for my own interpretation.      Injection Procedure: [Left carpal tunnel] The risks and benefits of a steroid injection were discussed in detail. The risks include but are not limited to: pain, infection, swelling, flare response, bleeding, subcutaneous fat atrophy, skin depigmentation and/or elevation of blood sugar. The risk of incomplete resolution of symptoms, recurrence and additional intervention was reviewed and considered by the patient.  The patient agreed to proceed and under a sterile prep, I injected 1 unit (6mg) into 1 cc of a combination of Celestone and Lidocaine into the above stated location for the procedure. The patient tolerated the injection well.  Injection Procedure: Left cubital tunnel The risks and benefits of a steroid injection were discussed in detail. The risks include but are not limited to: pain, infection, swelling, flare response, bleeding, subcutaneous fat atrophy, skin depigmentation and/or elevation of blood sugar. The risk of incomplete resolution of symptoms, recurrence and additional intervention was reviewed and considered by the patient.  The patient agreed to proceed and under a sterile prep, I injected 1 unit (6mg) into 1 cc of a combination of Celestone and Lidocaine into the above stated location for the procedure. The patient tolerated the injection well.  Injection Procedure: Left wrist de Quervain's tendon subsheath The risks and benefits of a steroid injection were discussed in detail. The risks include but are not limited to: pain, infection, swelling, flare response, bleeding, subcutaneous fat atrophy, skin depigmentation and/or elevation of blood sugar. The risk of incomplete resolution of symptoms, recurrence and additional intervention was reviewed and considered by the patient.  The patient agreed to proceed and under a sterile prep, I injected 1 unit (6mg) into 1 cc of a combination of Celestone and Lidocaine into the above stated location for the procedure. The patient tolerated the injection well.  Injection Procedure: Left thumb CMC medium joint The risks and benefits of a steroid injection were discussed in detail. The risks include but are not limited to: pain, infection, swelling, flare response, bleeding, subcutaneous fat atrophy, skin depigmentation and/or elevation of blood sugar. The risk of incomplete resolution of symptoms, recurrence and additional intervention was reviewed and considered by the patient.  The patient agreed to proceed and under a sterile prep, I injected 1 unit (6mg) into 1 cc of a combination of Celestone and Lidocaine into the above stated location for the procedure. The patient tolerated the injection well.   The patient was adequately and thoroughly informed of my assessment of their current condition(s).   DISCUSSION: 1.  Injections as above.  Activity modifications.  Bracing discussed.  Positive response.  Follow-up in 3 months. 2.  We have discussed both carpal tunnel and cubital tunnel release surgery. 3. [x]

## 2025-07-23 NOTE — PHYSICAL EXAM
[de-identified] : Examination of the [left] wrist reveals discomfort with flexion and compression at the level of the volar carpal tunnel eliciting numbness/tingling throughout the fingertips. There is a positive tinel. Patient is able to delineate numbness to median-sided digits volarly. [There is no obvious thenar atrophy] Examination of the [left] cubital tunnel reveals discomfort with compression with associated numbness and tingling at the fingertips. There is a positive tinel. Examination at the level of the [left] wrist reveals tenderness at the level of the first dorsal compartment with a positive finkelstein.  Examination of the [left] thumb basal joint reveals pain with compression of the CMC joint with a positive grind test for pain. Adjacent thumb MCP joint is stable without hyperextension [de-identified] : [4] views of [bilateral hands and wrists] were reviewed today in my office and were seen by me and discussed with the patient.  These [show findings consistent with bilateral basal joint OA and findings of IP joint OA]